# Patient Record
Sex: MALE | Race: WHITE | NOT HISPANIC OR LATINO | URBAN - METROPOLITAN AREA
[De-identification: names, ages, dates, MRNs, and addresses within clinical notes are randomized per-mention and may not be internally consistent; named-entity substitution may affect disease eponyms.]

---

## 2019-01-03 ENCOUNTER — INPATIENT (INPATIENT)
Facility: HOSPITAL | Age: 49
LOS: 3 days | Discharge: HOME | DRG: 195 | End: 2019-01-07
Attending: INTERNAL MEDICINE | Admitting: INTERNAL MEDICINE
Payer: COMMERCIAL

## 2019-01-03 VITALS
RESPIRATION RATE: 18 BRPM | SYSTOLIC BLOOD PRESSURE: 137 MMHG | HEART RATE: 129 BPM | TEMPERATURE: 98 F | OXYGEN SATURATION: 96 % | DIASTOLIC BLOOD PRESSURE: 80 MMHG

## 2019-01-03 DIAGNOSIS — Z95.2 PRESENCE OF PROSTHETIC HEART VALVE: Chronic | ICD-10-CM

## 2019-01-03 LAB
ALBUMIN SERPL ELPH-MCNC: 3.6 G/DL — SIGNIFICANT CHANGE UP (ref 3.4–5)
ALP SERPL-CCNC: 75 U/L — SIGNIFICANT CHANGE UP (ref 40–120)
ALT FLD-CCNC: 54 U/L — HIGH (ref 12–42)
ANION GAP SERPL CALC-SCNC: 8 MMOL/L — LOW (ref 9–16)
APPEARANCE UR: CLEAR — SIGNIFICANT CHANGE UP
APTT BLD: 29.3 SEC — SIGNIFICANT CHANGE UP (ref 27.5–36.3)
AST SERPL-CCNC: 39 U/L — HIGH (ref 15–37)
BASOPHILS NFR BLD AUTO: 0.4 % — SIGNIFICANT CHANGE UP (ref 0–2)
BILIRUB SERPL-MCNC: 0.8 MG/DL — SIGNIFICANT CHANGE UP (ref 0.2–1.2)
BILIRUB UR-MCNC: NEGATIVE — SIGNIFICANT CHANGE UP
BUN SERPL-MCNC: 17 MG/DL — SIGNIFICANT CHANGE UP (ref 7–23)
CALCIUM SERPL-MCNC: 9 MG/DL — SIGNIFICANT CHANGE UP (ref 8.5–10.5)
CHLORIDE SERPL-SCNC: 102 MMOL/L — SIGNIFICANT CHANGE UP (ref 96–108)
CO2 SERPL-SCNC: 24 MMOL/L — SIGNIFICANT CHANGE UP (ref 22–31)
COLOR SPEC: YELLOW — SIGNIFICANT CHANGE UP
CREAT SERPL-MCNC: 1 MG/DL — SIGNIFICANT CHANGE UP (ref 0.5–1.3)
DIFF PNL FLD: NEGATIVE — SIGNIFICANT CHANGE UP
EOSINOPHIL NFR BLD AUTO: 0.3 % — SIGNIFICANT CHANGE UP (ref 0–6)
GLUCOSE SERPL-MCNC: 206 MG/DL — HIGH (ref 70–99)
GLUCOSE UR QL: NEGATIVE — SIGNIFICANT CHANGE UP
HCT VFR BLD CALC: 47.9 % — SIGNIFICANT CHANGE UP (ref 39–50)
HGB BLD-MCNC: 16.5 G/DL — SIGNIFICANT CHANGE UP (ref 13–17)
IMM GRANULOCYTES NFR BLD AUTO: 0.4 % — SIGNIFICANT CHANGE UP (ref 0–1.5)
INR BLD: 1.13 — SIGNIFICANT CHANGE UP (ref 0.88–1.16)
KETONES UR-MCNC: NEGATIVE — SIGNIFICANT CHANGE UP
LACTATE SERPL-SCNC: 2 MMOL/L — SIGNIFICANT CHANGE UP (ref 0.4–2)
LEUKOCYTE ESTERASE UR-ACNC: NEGATIVE — SIGNIFICANT CHANGE UP
LYMPHOCYTES # BLD AUTO: 18.3 % — SIGNIFICANT CHANGE UP (ref 13–44)
MCHC RBC-ENTMCNC: 30.6 PG — SIGNIFICANT CHANGE UP (ref 27–34)
MCHC RBC-ENTMCNC: 34.4 G/DL — SIGNIFICANT CHANGE UP (ref 32–36)
MCV RBC AUTO: 88.9 FL — SIGNIFICANT CHANGE UP (ref 80–100)
MONOCYTES NFR BLD AUTO: 15 % — HIGH (ref 2–14)
NEUTROPHILS NFR BLD AUTO: 65.6 % — SIGNIFICANT CHANGE UP (ref 43–77)
NITRITE UR-MCNC: NEGATIVE — SIGNIFICANT CHANGE UP
PH UR: 6 — SIGNIFICANT CHANGE UP (ref 5–8)
PLATELET # BLD AUTO: 204 K/UL — SIGNIFICANT CHANGE UP (ref 150–400)
POTASSIUM SERPL-MCNC: 4.5 MMOL/L — SIGNIFICANT CHANGE UP (ref 3.5–5.3)
POTASSIUM SERPL-SCNC: 4.5 MMOL/L — SIGNIFICANT CHANGE UP (ref 3.5–5.3)
PROT SERPL-MCNC: 7.5 G/DL — SIGNIFICANT CHANGE UP (ref 6.4–8.2)
PROT UR-MCNC: NEGATIVE MG/DL — SIGNIFICANT CHANGE UP
PROTHROM AB SERPL-ACNC: 12.5 SEC — SIGNIFICANT CHANGE UP (ref 10–12.9)
RBC # BLD: 5.39 M/UL — SIGNIFICANT CHANGE UP (ref 4.2–5.8)
RBC # FLD: 12.3 % — SIGNIFICANT CHANGE UP (ref 10.3–14.5)
SODIUM SERPL-SCNC: 134 MMOL/L — SIGNIFICANT CHANGE UP (ref 132–145)
SP GR SPEC: >=1.03 — SIGNIFICANT CHANGE UP (ref 1–1.03)
UROBILINOGEN FLD QL: 0.2 E.U./DL — SIGNIFICANT CHANGE UP
WBC # BLD: 6.7 K/UL — SIGNIFICANT CHANGE UP (ref 3.8–10.5)
WBC # FLD AUTO: 6.7 K/UL — SIGNIFICANT CHANGE UP (ref 3.8–10.5)

## 2019-01-03 PROCEDURE — 71046 X-RAY EXAM CHEST 2 VIEWS: CPT | Mod: 26

## 2019-01-03 PROCEDURE — 99284 EMERGENCY DEPT VISIT MOD MDM: CPT | Mod: 25

## 2019-01-03 PROCEDURE — 93010 ELECTROCARDIOGRAM REPORT: CPT

## 2019-01-03 RX ORDER — SODIUM CHLORIDE 9 MG/ML
2400 INJECTION INTRAMUSCULAR; INTRAVENOUS; SUBCUTANEOUS ONCE
Qty: 0 | Refills: 0 | Status: COMPLETED | OUTPATIENT
Start: 2019-01-03 | End: 2019-01-03

## 2019-01-03 RX ORDER — ACETAMINOPHEN 500 MG
975 TABLET ORAL ONCE
Qty: 0 | Refills: 0 | Status: COMPLETED | OUTPATIENT
Start: 2019-01-03 | End: 2019-01-03

## 2019-01-03 RX ORDER — IBUPROFEN 200 MG
800 TABLET ORAL ONCE
Qty: 0 | Refills: 0 | Status: COMPLETED | OUTPATIENT
Start: 2019-01-03 | End: 2019-01-03

## 2019-01-03 RX ORDER — VANCOMYCIN HCL 1 G
1000 VIAL (EA) INTRAVENOUS ONCE
Qty: 0 | Refills: 0 | Status: COMPLETED | OUTPATIENT
Start: 2019-01-03 | End: 2019-01-03

## 2019-01-03 RX ORDER — VANCOMYCIN HCL 1 G
500 VIAL (EA) INTRAVENOUS ONCE
Qty: 0 | Refills: 0 | Status: COMPLETED | OUTPATIENT
Start: 2019-01-03 | End: 2019-01-03

## 2019-01-03 RX ADMIN — SODIUM CHLORIDE 1200 MILLILITER(S): 9 INJECTION INTRAMUSCULAR; INTRAVENOUS; SUBCUTANEOUS at 19:44

## 2019-01-03 RX ADMIN — Medication 800 MILLIGRAM(S): at 19:44

## 2019-01-03 RX ADMIN — Medication 975 MILLIGRAM(S): at 19:44

## 2019-01-03 RX ADMIN — Medication 1000 MILLIGRAM(S): at 19:24

## 2019-01-03 RX ADMIN — Medication 100 MILLIGRAM(S): at 22:46

## 2019-01-03 RX ADMIN — SODIUM CHLORIDE 2400 MILLILITER(S): 9 INJECTION INTRAMUSCULAR; INTRAVENOUS; SUBCUTANEOUS at 19:47

## 2019-01-03 RX ADMIN — Medication 250 MILLIGRAM(S): at 18:15

## 2019-01-03 NOTE — ED PROVIDER NOTE - OBJECTIVE STATEMENT
48 y.o. male with hx prosthetic valve (unsure which valve) placed 6 years ago (congenital problem), with c/o subjective fever/chills for 2 days, non productive cough, gen weakness.  Has been taking levaquin PO x 1 dose and tylenol at home. on arrival to ED mild tachy 120s, afebrile in NAD.

## 2019-01-03 NOTE — ED PROVIDER NOTE - PROGRESS NOTE DETAILS
[facility] cardiology dr [facility]/endocarditis workup. accepted for admission to [facility]/cardiology DR DON

## 2019-01-03 NOTE — ED ADULT NURSE NOTE - CHIEF COMPLAINT QUOTE
Patient c/o fever and cough. Visiting from [place]. Hx. Valve replacement. Taking Paracetamol 1000mg q. 4 hrs. Afebrile on arrival. , denies palpitations or chest pain.

## 2019-01-03 NOTE — ED ADULT TRIAGE NOTE - CHIEF COMPLAINT QUOTE
Patient c/o fever and cough. Visiting from [place]. Hx. Valve replacement. Taking Paracetamol 1000mg q. 4 hrs. Afebrile on arrival. , denies palpitations or chest pain.

## 2019-01-03 NOTE — ED PROVIDER NOTE - MEDICAL DECISION MAKING DETAILS
[facility] for [facility]/endocarditis workup. accepted for admission to [facility]/cardiology DR DON

## 2019-01-03 NOTE — PATIENT PROFILE ADULT - OVER THE PAST TWO WEEKS, HAVE YOU FELT LITTLE INTEREST OR PLEASURE IN DOING THINGS?
Partial Purse String (Intermediate) Text: Given the location of the defect and the characteristics of the surrounding skin an intermediate purse string closure was deemed most appropriate.  Undermining was performed circumferentially around the surgical defect.  A purse string suture was then placed and tightened. Wound tension of the circular defect prevented complete closure of the wound. no

## 2019-01-04 DIAGNOSIS — Z90.49 ACQUIRED ABSENCE OF OTHER SPECIFIED PARTS OF DIGESTIVE TRACT: Chronic | ICD-10-CM

## 2019-01-04 DIAGNOSIS — Q23.9 CONGENITAL MALFORMATION OF AORTIC AND MITRAL VALVES, UNSPECIFIED: ICD-10-CM

## 2019-01-04 DIAGNOSIS — R50.9 FEVER, UNSPECIFIED: ICD-10-CM

## 2019-01-04 LAB
ALBUMIN SERPL ELPH-MCNC: 4.1 G/DL — SIGNIFICANT CHANGE UP (ref 3.3–5)
ALP SERPL-CCNC: 64 U/L — SIGNIFICANT CHANGE UP (ref 40–120)
ALT FLD-CCNC: 49 U/L — HIGH (ref 10–45)
APTT BLD: 30 SEC — SIGNIFICANT CHANGE UP (ref 27.5–36.3)
AST SERPL-CCNC: 29 U/L — SIGNIFICANT CHANGE UP (ref 10–40)
BILIRUB DIRECT SERPL-MCNC: <0.2 MG/DL — SIGNIFICANT CHANGE UP (ref 0–0.2)
BILIRUB INDIRECT FLD-MCNC: >0.4 MG/DL — SIGNIFICANT CHANGE UP (ref 0.2–1)
BILIRUB SERPL-MCNC: 0.6 MG/DL — SIGNIFICANT CHANGE UP (ref 0.2–1.2)
CHOLEST SERPL-MCNC: 272 MG/DL — HIGH (ref 10–199)
CK MB CFR SERPL CALC: <1 NG/ML — SIGNIFICANT CHANGE UP (ref 0–6.7)
CK SERPL-CCNC: 157 U/L — SIGNIFICANT CHANGE UP (ref 30–200)
CULTURE RESULTS: NO GROWTH — SIGNIFICANT CHANGE UP
D DIMER BLD IA.RAPID-MCNC: 206 NG/ML DDU — SIGNIFICANT CHANGE UP
FLUAV H1 RNA SPEC QL NAA+PROBE: DETECTED
HCT VFR BLD CALC: 48.1 % — SIGNIFICANT CHANGE UP (ref 39–50)
HDLC SERPL-MCNC: 65 MG/DL — SIGNIFICANT CHANGE UP
HGB BLD-MCNC: 16.5 G/DL — SIGNIFICANT CHANGE UP (ref 13–17)
INR BLD: 1.11 — SIGNIFICANT CHANGE UP (ref 0.88–1.16)
LIPID PNL WITH DIRECT LDL SERPL: 183 MG/DL — HIGH
MCHC RBC-ENTMCNC: 30.8 PG — SIGNIFICANT CHANGE UP (ref 27–34)
MCHC RBC-ENTMCNC: 34.3 G/DL — SIGNIFICANT CHANGE UP (ref 32–36)
MCV RBC AUTO: 89.7 FL — SIGNIFICANT CHANGE UP (ref 80–100)
PLATELET # BLD AUTO: 171 K/UL — SIGNIFICANT CHANGE UP (ref 150–400)
PROT SERPL-MCNC: 6.6 G/DL — SIGNIFICANT CHANGE UP (ref 6–8.3)
PROTHROM AB SERPL-ACNC: 12.6 SEC — SIGNIFICANT CHANGE UP (ref 10–12.9)
RAPID RVP RESULT: DETECTED
RBC # BLD: 5.36 M/UL — SIGNIFICANT CHANGE UP (ref 4.2–5.8)
RBC # FLD: 12.5 % — SIGNIFICANT CHANGE UP (ref 10.3–16.9)
SPECIMEN SOURCE: SIGNIFICANT CHANGE UP
TOTAL CHOLESTEROL/HDL RATIO MEASUREMENT: 4.2 RATIO — SIGNIFICANT CHANGE UP (ref 3.4–9.6)
TRIGL SERPL-MCNC: 122 MG/DL — SIGNIFICANT CHANGE UP (ref 10–149)
TROPONIN T SERPL-MCNC: <0.01 NG/ML — SIGNIFICANT CHANGE UP (ref 0–0.01)
WBC # BLD: 6.9 K/UL — SIGNIFICANT CHANGE UP (ref 3.8–10.5)
WBC # FLD AUTO: 6.9 K/UL — SIGNIFICANT CHANGE UP (ref 3.8–10.5)

## 2019-01-04 PROCEDURE — 93306 TTE W/DOPPLER COMPLETE: CPT | Mod: 26

## 2019-01-04 PROCEDURE — 99233 SBSQ HOSP IP/OBS HIGH 50: CPT

## 2019-01-04 RX ORDER — ACETAMINOPHEN 500 MG
650 TABLET ORAL EVERY 6 HOURS
Qty: 0 | Refills: 0 | Status: DISCONTINUED | OUTPATIENT
Start: 2019-01-04 | End: 2019-01-07

## 2019-01-04 RX ORDER — VANCOMYCIN HCL 1 G
1500 VIAL (EA) INTRAVENOUS EVERY 12 HOURS
Qty: 0 | Refills: 0 | Status: DISCONTINUED | OUTPATIENT
Start: 2019-01-04 | End: 2019-01-04

## 2019-01-04 RX ORDER — BENZOCAINE AND MENTHOL 5; 1 G/100ML; G/100ML
1 LIQUID ORAL THREE TIMES A DAY
Qty: 0 | Refills: 0 | Status: DISCONTINUED | OUTPATIENT
Start: 2019-01-04 | End: 2019-01-07

## 2019-01-04 RX ORDER — ACETAMINOPHEN 500 MG
650 TABLET ORAL ONCE
Qty: 0 | Refills: 0 | Status: COMPLETED | OUTPATIENT
Start: 2019-01-04 | End: 2019-01-04

## 2019-01-04 RX ORDER — INFLUENZA VIRUS VACCINE 15; 15; 15; 15 UG/.5ML; UG/.5ML; UG/.5ML; UG/.5ML
0.5 SUSPENSION INTRAMUSCULAR ONCE
Qty: 0 | Refills: 0 | Status: DISCONTINUED | OUTPATIENT
Start: 2019-01-04 | End: 2019-01-04

## 2019-01-04 RX ORDER — SODIUM CHLORIDE 9 MG/ML
1000 INJECTION INTRAMUSCULAR; INTRAVENOUS; SUBCUTANEOUS
Qty: 0 | Refills: 0 | Status: DISCONTINUED | OUTPATIENT
Start: 2019-01-04 | End: 2019-01-07

## 2019-01-04 RX ORDER — SODIUM CHLORIDE 9 MG/ML
500 INJECTION INTRAMUSCULAR; INTRAVENOUS; SUBCUTANEOUS
Qty: 0 | Refills: 0 | Status: DISCONTINUED | OUTPATIENT
Start: 2019-01-04 | End: 2019-01-07

## 2019-01-04 RX ORDER — HEPARIN SODIUM 5000 [USP'U]/ML
5000 INJECTION INTRAVENOUS; SUBCUTANEOUS EVERY 8 HOURS
Qty: 0 | Refills: 0 | Status: DISCONTINUED | OUTPATIENT
Start: 2019-01-04 | End: 2019-01-07

## 2019-01-04 RX ADMIN — Medication 650 MILLIGRAM(S): at 08:41

## 2019-01-04 RX ADMIN — SODIUM CHLORIDE 75 MILLILITER(S): 9 INJECTION INTRAMUSCULAR; INTRAVENOUS; SUBCUTANEOUS at 23:00

## 2019-01-04 RX ADMIN — Medication 650 MILLIGRAM(S): at 04:08

## 2019-01-04 RX ADMIN — Medication 100 MILLIGRAM(S): at 22:36

## 2019-01-04 RX ADMIN — BENZOCAINE AND MENTHOL 1 LOZENGE: 5; 1 LIQUID ORAL at 15:32

## 2019-01-04 RX ADMIN — Medication 650 MILLIGRAM(S): at 22:36

## 2019-01-04 RX ADMIN — BENZOCAINE AND MENTHOL 1 LOZENGE: 5; 1 LIQUID ORAL at 22:39

## 2019-01-04 RX ADMIN — Medication 75 MILLIGRAM(S): at 07:44

## 2019-01-04 RX ADMIN — Medication 650 MILLIGRAM(S): at 14:42

## 2019-01-04 RX ADMIN — Medication 75 MILLIGRAM(S): at 22:37

## 2019-01-04 RX ADMIN — HEPARIN SODIUM 5000 UNIT(S): 5000 INJECTION INTRAVENOUS; SUBCUTANEOUS at 22:38

## 2019-01-04 RX ADMIN — Medication 650 MILLIGRAM(S): at 04:45

## 2019-01-04 RX ADMIN — Medication 100 MILLIGRAM(S): at 08:45

## 2019-01-04 RX ADMIN — SODIUM CHLORIDE 100 MILLILITER(S): 9 INJECTION INTRAMUSCULAR; INTRAVENOUS; SUBCUTANEOUS at 15:49

## 2019-01-04 RX ADMIN — Medication 650 MILLIGRAM(S): at 23:00

## 2019-01-04 RX ADMIN — Medication 650 MILLIGRAM(S): at 09:19

## 2019-01-04 RX ADMIN — Medication 650 MILLIGRAM(S): at 15:56

## 2019-01-04 RX ADMIN — Medication 100 MILLIGRAM(S): at 14:46

## 2019-01-04 NOTE — H&P ADULT - NSHPREVIEWOFSYSTEMS_GEN_ALL_CORE
GENERAL, CONSTITUTIONAL : admits to fever (subjective Tmax 102F) and chills  EYES, VISION: denies changes in vision   EARS, NOSE, THROAT: denies hearing loss  HEART, CARDIOVASCULAR: denies chest pain, arrhythmia  palpitations,   RESPIRATORY: admits to productive cough, denies SOB, wheezing, PND, orthopnea  GASTROINTESTINAL: Denies abdominal pain, heartburn, bloody stool, dark tarry stool  GENITOURINARY: Denies frequent urination, urgency  MUSCULOSKELETAL denies joint pain or swelling, restricted motion, musculoskeletal pain.   SKIN & INTEGUMENTARY Denies rashes, sores, blisters, blisters, growths.  NEUROLOGICAL: Denies numbness or tingling sensations, sensation loss, burning.   PSYCHIATRIC: Denies nervousness, anxiety, depression  ENDOCRINE Denies heat or cold intolerance, excessive thirst  HEMATOLOGIC/LYMPHATIC: Denies abnormal bleeding, bleeding of any kind

## 2019-01-04 NOTE — H&P ADULT - PMH
Congenital aortic valve anomaly  s/p AVR (Porcine valve 6 yrs ago) in [place]  Valvular disease  cardiac valve

## 2019-01-04 NOTE — H&P ADULT - HISTORY OF PRESENT ILLNESS
Hong Konger  # [id number]    49 y/o Hong Konger speaking male visiting from [place] (supposed to fly back on Sunday, January 6, 2019) with PMHx of Congenital Aortic Valve defect (s/p AVR w/ porcine valve 6yrs ago in [place]) presents as a transfer from [facility] this evening, 1/3/19, to [facility] w/ subjective fever (Tmax 102.F yesterday evening, 1/2/19), chills and productive cough X2 days, pt. noted to be Tachycardic 's, r/o Endocarditis.   According to patient, he was in his usual good state of health until Wednesday evening, he felt congestive, with productive cough, fever (pt. took oral temperature 102F) and chills.  Pt. took Levofloxacin 500mg PO X1 Wednesday evening, pt. states he carries antibiotic with him ever since he had AVR as prophylaxis measure in case he has to go to dentist or has URI.    Pt. states on Thursday morning he still felt feverish, took another Levofloxacin 500mg PO X1 hoping symptoms would resolve right away. Later in the afternoon pt. became anxious since symptoms have not resolved and went to [facility] for further evaluation.   Pt. denies, cp, SOB, palpitations, dizziness, abdominal discomfort, N/V and bowel changes. In ER ECG reveals NSR@78 bpm with non specific ST-T wave changes, Chadian  # [id number]    49 y/o Chadian speaking male visiting from [place] (supposed to fly back on Sunday, January 6, 2019) with PMHx of Congenital Aortic Valve defect (s/p AVR w/ porcine valve 6yrs ago in [place]) presents as a transfer from [facility] this evening, 1/3/19, to [address] w/ subjective fever (Tmax 102.F yesterday evening, 1/2/19), chills and productive cough X2 days, pt. noted to be Tachycardic 's, r/o Endocarditis.   According to patient, he was in his usual good state of health until Wednesday evening, he felt congestive, with productive cough, fever (pt. took oral temperature 102F) and chills.  Pt. took Levofloxacin 500mg PO X1 Wednesday evening, pt. states he carries antibiotic with him ever since he had AVR as prophylaxis measure in case he has to go to dentist or has URI.    Pt. states on Thursday morning he still felt feverish, took another Levofloxacin 500mg PO X1 hoping symptoms would resolve right away. Later in the afternoon pt. became anxious since symptoms have not resolved and went to [facility] for further evaluation.   Pt. denies, cp, SOB, palpitations, dizziness, abdominal discomfort, N/V and bowel changes. In ER ECG reveals NSR@78 bpm with non specific ST-T wave changes, wbc normal (6.7), Monocyte 15.0, Blood Glucose 206, Temp 99.0F  CXR reveals no acute pathology seen, UA negative.  Blood cultures drawn f/u results (for r/o Endocarditis), pt. was given Vancomycin 1500mg IV X1.  Pt. was explained he will need OPAL; pt. is refusing.   Pt. transferred to [address] for telemetry, ECG, Echocardiogram, RVP panel and NPO for further workup to r/o Endocarditis.  Pt. remains afebrile and states he is feeling better.  F/U RVP panel.  Discuss further with Dr. Anne in AM.

## 2019-01-04 NOTE — H&P ADULT - NSHPLABSRESULTS_GEN_ALL_CORE
16.5   6.7   )-----------( 204      ( 03 Jan 2019 18:01 )             47.9       01-03    134  |  102  |  17  ----------------------------<  206<H>  4.5   |  24  |  1.00    Ca    9.0      03 Jan 2019 18:01    TPro  7.5  /  Alb  3.6  /  TBili  0.8  /  DBili  x   /  AST  39<H>  /  ALT  54<H>  /  AlkPhos  75  01-03      PT/INR - ( 03 Jan 2019 18:01 )   PT: 12.5 sec;   INR: 1.13          PTT - ( 03 Jan 2019 18:01 )  PTT:29.3 sec          Urinalysis Basic - ( 03 Jan 2019 19:45 )    Color: Yellow / Appearance: Clear / SG: >=1.030 / pH: x  Gluc: x / Ketone: NEGATIVE  / Bili: NEGATIVE / Urobili: 0.2 E.U./dL   Blood: x / Protein: NEGATIVE mg/dL / Nitrite: NEGATIVE   Leuk Esterase: NEGATIVE / RBC: x / WBC x   Sq Epi: x / Non Sq Epi: x / Bacteria: x        EKG: NSR@78bpm with non specific ST-T wave changes

## 2019-01-04 NOTE — H&P ADULT - ATTENDING COMMENTS
Reviewed NP/PA/Resident documentation. Reviewed vitals, labs, medications, cardiac studies and additional imaging 1/4/19. Agree with the above with the following additions/amendments:  48M visiting from [place] (adamant about returning on 1/6/19) with h/o congenital AV defect s/p bio AVR, p/w acute influenza infection, febrile, tachycardic.  TTE unremarkable with good function of prosthetic valve. Febrile earlier today, no longer on antibiotics. Awaiting blood cultures to return.  If recurrence of fever on 1/5/19, plan to resume abx, c/s ID and obtain OPAL.  Patient not amenable to this plan and is adamant about leaving on 1/6/19.

## 2019-01-04 NOTE — H&P ADULT - REASON FOR ADMISSION
Productive cough w/ fever and chills (subjective Tmax 102F yesterday evening) X2 days.  Pt. is visiting from [place] until Sunday, January 6, 2019

## 2019-01-04 NOTE — H&P ADULT - NSHPPHYSICALEXAM_GEN_ALL_CORE
Temp 98.6F, HR 70bpm, /80, RR 16, O2 sat 98% RA, Weight 200lbs, Height 5' 6"    T(C): 37.7 (01-03-19 @ 21:50), Max: 37.7 (01-03-19 @ 18:29)  HR: 82 (01-03-19 @ 23:44) (82 - 129)  BP: 145/79 (01-03-19 @ 23:44) (127/63 - 145/79)  RR: 18 (01-03-19 @ 23:44) (17 - 18)  SpO2: 97% (01-03-19 @ 23:44) (96% - 99%)  Wt(kg): --    Appearance: Normal	  HEENT:   Normal oral mucosa, PERRL, EOMI	  Neck: Supple, + JVD/ - JVD; No Carotid Bruit and 2+ pulses B/L  Cardiovascular: Normal S1 S2, No JVD, No murmurs  Respiratory: Lungs clear to auscultation/Decreased Breath Sounds/No Rales, Rhonchi, Wheezing	  Gastrointestinal:  Soft, Non-tender, + BS	  Skin: No rashes, No ecchymoses, No cyanosis  Extremities: Normal range of motion, No clubbing, cyanosis or edema  Vascular: Femoral pulses 2+ b/l without bruit, DP 1+ b/l, PT 1+ b/l  Neurologic: Non-focal  Psychiatry: A & O x 3, Mood & affect appropriate T(C): 37.7 (01-03-19 @ 21:50), Max: 37.7 (01-03-19 @ 18:29)  HR: 82 (01-03-19 @ 23:44) (82 - 129)  BP: 145/79 (01-03-19 @ 23:44) (127/63 - 145/79)  RR: 18 (01-03-19 @ 23:44) (17 - 18)  SpO2: 97% (01-03-19 @ 23:44) (96% - 99%)  Wt(kg): --    Appearance: Normal	  HEENT:   Normal oral mucosa, PERRL, EOMI	  Neck: Supple, - JVD; No Carotid Bruit and 2+ pulses B/L  Cardiovascular: Normal S1 S2, No JVD, No murmurs  Respiratory: Lungs clear to auscultation//No Rales, Rhonchi, Wheezing	  Gastrointestinal:  Soft, Non-tender, + BS	  Skin: No rashes, No ecchymoses, No cyanosis  Extremities: Normal range of motion, No clubbing, cyanosis or edema  Vascular: Femoral pulses 2+ b/l without bruit, DP 2+ b/l, PT 1+ b/l  Neurologic: Non-focal  Psychiatry: A & O x 3, Mood & affect appropriate T(C): 37.7 (01-03-19 @ 21:50), Max: 37.7 (01-03-19 @ 18:29)  HR: 82 (01-03-19 @ 23:44) (82 - 129)  BP: 145/79 (01-03-19 @ 23:44) (127/63 - 145/79)  RR: 18 (01-03-19 @ 23:44) (17 - 18)  SpO2: 97% (01-03-19 @ 23:44) (96% - 99%)  Wt(kg): --    Appearance: Normal	  HEENT:   Normal oral mucosa, PERRL, EOMI	  Neck: Supple, - JVD; No Carotid Bruit and 2+ pulses B/L  Cardiovascular: Normal S1 S2, No JVD, +systolic murmur heard 2nd RICS radiating across chest  Respiratory: Lungs clear to auscultation//No Rales, Rhonchi, Wheezing	  Gastrointestinal:  Soft, Non-tender, + BS	  Skin: No rashes, No ecchymoses, No cyanosis  Extremities: Normal range of motion, No clubbing, cyanosis or edema  Vascular: Femoral pulses 2+ b/l without bruit, DP 2+ b/l, PT 1+ b/l  Neurologic: Non-focal  Psychiatry: A & O x 3, Mood & affect appropriate

## 2019-01-04 NOTE — H&P ADULT - PROBLEM SELECTOR PLAN 1
Pt. w/ hx of AVR (Porcine valve) 6yrs ago in [place].  Pt. endorses fever, chills and productive cough two days.  Pt. took his own Levofloxacin 500mg PO X2 for symptoms 2 days.    F/U blood cultures, labs, Echocardiogram, (pt. refusing OPAL) and RVP panel.  Pt. is to be ruled out for Endocarditis

## 2019-01-04 NOTE — H&P ADULT - PROBLEM SELECTOR PLAN 2
Echocardiogram in AM.  AVR (Porcine valve) 6 yrs ago in [place].  Pt. states he carries Levofloxacin as prophylaxis measure, in case he goes to dentist or has URI.

## 2019-01-04 NOTE — H&P ADULT - ASSESSMENT
47 y/o Kinyarwanda speaking male visiting from [place] (supposed to fly back on Sunday, January 6, 2019) with PMHx of Congenital Aortic Valve defect (s/p AVR w/ porcine valve 6yrs ago in [place]) presents as a transfer from [facility] this evening, 1/3/19, to [address] w/ subjective fever (Tmax 102.F yesterday evening, 1/2/19), chills and productive cough X2 days, pt. noted to be Tachycardic 's, r/o Endocarditis.   Blood cultures drawn f/u results (for r/o Endocarditis), pt. was given Vancomycin 1500mg IV X1.  Pt. was explained he will need OPAL; pt. is refusing.   Pt. transferred to [address] for telemetry, ECG, Echocardiogram, RVP panel and NPO for further workup to r/o Endocarditis.  Pt. remains afebrile and states he is feeling better. Continue Vancomycin 1500mg IV bid in AM.  F/U RVP panel.  Discuss further with Dr. Anne in AM.

## 2019-01-05 LAB
ANION GAP SERPL CALC-SCNC: 15 MMOL/L — SIGNIFICANT CHANGE UP (ref 5–17)
BASOPHILS NFR BLD AUTO: 0.2 % — SIGNIFICANT CHANGE UP (ref 0–2)
BUN SERPL-MCNC: 9 MG/DL — SIGNIFICANT CHANGE UP (ref 7–23)
CALCIUM SERPL-MCNC: 9 MG/DL — SIGNIFICANT CHANGE UP (ref 8.4–10.5)
CHLORIDE SERPL-SCNC: 98 MMOL/L — SIGNIFICANT CHANGE UP (ref 96–108)
CO2 SERPL-SCNC: 24 MMOL/L — SIGNIFICANT CHANGE UP (ref 22–31)
CREAT SERPL-MCNC: 0.82 MG/DL — SIGNIFICANT CHANGE UP (ref 0.5–1.3)
CRP SERPL-MCNC: 6.5 MG/DL — HIGH (ref 0–0.4)
EOSINOPHIL NFR BLD AUTO: 0.2 % — SIGNIFICANT CHANGE UP (ref 0–6)
ERYTHROCYTE [SEDIMENTATION RATE] IN BLOOD: 12 MM/HR — SIGNIFICANT CHANGE UP
GLUCOSE SERPL-MCNC: 201 MG/DL — HIGH (ref 70–99)
HCT VFR BLD CALC: 48.2 % — SIGNIFICANT CHANGE UP (ref 39–50)
HGB BLD-MCNC: 16.5 G/DL — SIGNIFICANT CHANGE UP (ref 13–17)
LDH SERPL L TO P-CCNC: 281 U/L — HIGH (ref 50–242)
LYMPHOCYTES # BLD AUTO: 38.8 % — SIGNIFICANT CHANGE UP (ref 13–44)
MAGNESIUM SERPL-MCNC: 1.8 MG/DL — SIGNIFICANT CHANGE UP (ref 1.6–2.6)
MCHC RBC-ENTMCNC: 30.7 PG — SIGNIFICANT CHANGE UP (ref 27–34)
MCHC RBC-ENTMCNC: 34.2 G/DL — SIGNIFICANT CHANGE UP (ref 32–36)
MCV RBC AUTO: 89.6 FL — SIGNIFICANT CHANGE UP (ref 80–100)
MONOCYTES NFR BLD AUTO: 14.9 % — HIGH (ref 2–14)
NEUTROPHILS NFR BLD AUTO: 45.9 % — SIGNIFICANT CHANGE UP (ref 43–77)
PLATELET # BLD AUTO: 173 K/UL — SIGNIFICANT CHANGE UP (ref 150–400)
POTASSIUM SERPL-MCNC: 3.9 MMOL/L — SIGNIFICANT CHANGE UP (ref 3.5–5.3)
POTASSIUM SERPL-SCNC: 3.9 MMOL/L — SIGNIFICANT CHANGE UP (ref 3.5–5.3)
RBC # BLD: 5.38 M/UL — SIGNIFICANT CHANGE UP (ref 4.2–5.8)
RBC # FLD: 12.6 % — SIGNIFICANT CHANGE UP (ref 10.3–16.9)
SODIUM SERPL-SCNC: 137 MMOL/L — SIGNIFICANT CHANGE UP (ref 135–145)
VANCOMYCIN TROUGH SERPL-MCNC: <4 UG/ML — LOW (ref 10–20)
WBC # BLD: 6.5 K/UL — SIGNIFICANT CHANGE UP (ref 3.8–10.5)
WBC # FLD AUTO: 6.5 K/UL — SIGNIFICANT CHANGE UP (ref 3.8–10.5)

## 2019-01-05 PROCEDURE — 99232 SBSQ HOSP IP/OBS MODERATE 35: CPT

## 2019-01-05 PROCEDURE — 99254 IP/OBS CNSLTJ NEW/EST MOD 60: CPT

## 2019-01-05 RX ORDER — POTASSIUM CHLORIDE 20 MEQ
20 PACKET (EA) ORAL ONCE
Qty: 0 | Refills: 0 | Status: COMPLETED | OUTPATIENT
Start: 2019-01-05 | End: 2019-01-05

## 2019-01-05 RX ORDER — ACYCLOVIR 50 MG/G
1 OINTMENT TOPICAL
Qty: 0 | Refills: 0 | Status: DISCONTINUED | OUTPATIENT
Start: 2019-01-05 | End: 2019-01-07

## 2019-01-05 RX ORDER — MAGNESIUM OXIDE 400 MG ORAL TABLET 241.3 MG
800 TABLET ORAL ONCE
Qty: 0 | Refills: 0 | Status: COMPLETED | OUTPATIENT
Start: 2019-01-05 | End: 2019-01-05

## 2019-01-05 RX ADMIN — BENZOCAINE AND MENTHOL 1 LOZENGE: 5; 1 LIQUID ORAL at 21:28

## 2019-01-05 RX ADMIN — ACYCLOVIR 1 APPLICATION(S): 50 OINTMENT TOPICAL at 11:57

## 2019-01-05 RX ADMIN — Medication 75 MILLIGRAM(S): at 21:15

## 2019-01-05 RX ADMIN — BENZOCAINE AND MENTHOL 1 LOZENGE: 5; 1 LIQUID ORAL at 15:35

## 2019-01-05 RX ADMIN — Medication 100 MILLIGRAM(S): at 05:32

## 2019-01-05 RX ADMIN — HEPARIN SODIUM 5000 UNIT(S): 5000 INJECTION INTRAVENOUS; SUBCUTANEOUS at 14:11

## 2019-01-05 RX ADMIN — HEPARIN SODIUM 5000 UNIT(S): 5000 INJECTION INTRAVENOUS; SUBCUTANEOUS at 05:32

## 2019-01-05 RX ADMIN — Medication 100 MILLIGRAM(S): at 14:16

## 2019-01-05 RX ADMIN — Medication 100 MILLIGRAM(S): at 21:21

## 2019-01-05 RX ADMIN — ACYCLOVIR 1 APPLICATION(S): 50 OINTMENT TOPICAL at 21:20

## 2019-01-05 RX ADMIN — Medication 75 MILLIGRAM(S): at 07:59

## 2019-01-05 RX ADMIN — Medication 20 MILLIEQUIVALENT(S): at 07:59

## 2019-01-05 RX ADMIN — MAGNESIUM OXIDE 400 MG ORAL TABLET 800 MILLIGRAM(S): 241.3 TABLET ORAL at 07:58

## 2019-01-05 RX ADMIN — HEPARIN SODIUM 5000 UNIT(S): 5000 INJECTION INTRAVENOUS; SUBCUTANEOUS at 21:16

## 2019-01-05 RX ADMIN — BENZOCAINE AND MENTHOL 1 LOZENGE: 5; 1 LIQUID ORAL at 05:33

## 2019-01-05 NOTE — PROGRESS NOTE ADULT - PROBLEM SELECTOR PLAN 2
Echocardiogram in AM.  AVR (Porcine valve) 6 yrs ago in [place].  Pt. states he carries Levofloxacin as prophylaxis measure, in case he goes to dentist or has URI. AVR (Porcine valve) in 2013 in [place].    - Pt states he carries Levofloxacin as prophylaxis measure, in case he goes to dentist or has URI.  - TTE revealed bioprosthetic aortic valve, normally functioning,    VTE ppx: Heparin SUBQ    Dispo: Pending ID consult, pt's wife going to ED today to r/o influenza, flight booked 1/7/19 @ 8PM.     Case d/w Dr. Jean

## 2019-01-05 NOTE — PROGRESS NOTE ADULT - PROBLEM SELECTOR PLAN 1
Pt. w/ hx of AVR (Porcine valve) 6yrs ago in [place].  Pt. endorses fever, chills and productive cough two days.  Pt. took his own Levofloxacin 500mg PO X2 for symptoms 2 days.    F/U blood cultures, labs, Echocardiogram, (pt. refusing OPAL) and RVP panel.  Pt. is to be ruled out for Endocarditis Pt with fever, chills and productive cough X2 days and found be tachycardic and febrile (Tmax 102.3 on 1/4), r/i SIRS criteria, found to be influenza A positive with concern for possible endocarditis given h/o AVR (Porcine valve) in 2013 in [place].   - WBC WNL, ESR WNL, CRP 6.5, lactic acid WNL, d-dimer neg, trop neg x2  - Pt took his own Levofloxacin 500mg PO X2 for symptoms 2 days.    - Pt received vanc x 1 dose in ED at [facility] on 1/4  - Pt received NS @ 100 cc/hr 4 hours and then received IV NS @ 75 cc/hr x 10 hours o/n  - BCx 1/4 NGTD, sent repeat BCx 1/5, plan to send additional set tomorrow as per ID recommendations, UCx NGTD  - TTE on 1/4 revealed normal left and right ventricular size and systolic function,  LVEF 52%, bioprosthetic aortic valve, normally functioning, mild symmetric LVH, no  segmental wall motion abnormalities  - Pt adamantly refusing OPAL and says he will complete this back in [place]  - f/u with ID if OPAL warranted from ID standpoint, optimal duration of hospital stay given that the cultures were just done, ?whether to continue Abx  - c/w Tamiflu 75 mg BID x 5 days (started 1/4) Pt with fever, chills and productive cough X2 days and found be tachycardic and febrile (Tmax 102.3 on 1/4), r/i SIRS criteria, found to be influenza A positive with concern for possible endocarditis given h/o AVR (Porcine valve) in 2013 in [place].   - WBC WNL, ESR WNL, CRP 6.5, lactic acid WNL, d-dimer neg, trop neg x2  - Pt took his own Levofloxacin 500mg PO X2 for symptoms 2 days.    - Pt received vanc x 1 dose in ED at [facility] on 1/4  - Pt received NS @ 100 cc/hr 4 hours and then received IV NS @ 75 cc/hr x 10 hours o/n  - BCx 1/4 NGTD, sent repeat BCx 1/5, plan to send additional set tomorrow as per ID recommendations, UCx NGTD  - TTE on 1/4 revealed normal left and right ventricular size and systolic function,  LVEF 52%, bioprosthetic aortic valve, normally functioning, mild symmetric LVH, no  segmental wall motion abnormalities  - Pt adamantly refusing OPAL and says he will complete this back in [place]  - f/u with ID if OPAL warranted from ID standpoint, optimal duration of hospital stay given that the cultures were just done, ?whether to continue Abx  - c/w Tamiflu 75 mg BID x 5 days (started 1/4). No abx at this time as per Dr. Jean. Pending ID recommendations.

## 2019-01-05 NOTE — CONSULT NOTE ADULT - SUBJECTIVE AND OBJECTIVE BOX
HPI:  Anguillan  # [id number]    49 y/o Anguillan speaking male visiting from [place] with PMHx of Congenital Aortic Valve defect (s/p AVR w/ porcine valve 6yrs ago in [place]) presents as a transfer from [facility] this evening, 1/3/19, to [facility] w/ subjective fever (Tmax 102.F yesterday evening, 1/2/19), chills and productive cough X2 days, pt. noted to be Tachycardic 's, r/o Endocarditis.   According to patient, he was in his usual good state of health until Wednesday evening, he felt congestive, with productive cough, fever (pt. took oral temperature 102F) and chills.  Pt. took Levofloxacin 500mg PO q12 starting Wednesday evening, pt. states he carries antibiotic with him ever since he had AVR as prophylaxis measure in case he has to go to dentist or has URI.    Pt. states on Thursday morning he still felt feverish, took another Levofloxacin 500mg PO X1 hoping symptoms would resolve right away. Later in the afternoon pt. became anxious since symptoms have not resolved and went to [facility] for further evaluation. Blood cultures were sent and have been no growth to date. He was found to have the flu and started on tamiflu. His fevers have resolved.  He denies any SOB, runny nose, cough    ID consulted to evaluate for endocarditis.        PAST MEDICAL & SURGICAL HISTORY:  Congenital aortic valve anomaly: s/p AVR (Porcine valve 6 yrs ago) in [place]  Valvular disease: cardiac valve  S/P appendectomy  S/P cholecystectomy  History of mitral valve replacement        REVIEW OF SYSTEMS:    General:	 no weakness; + fevers, no chills  Skin/Breast: no rash  Respiratory and Thorax: no SOB, + cough  Cardiovascular:	No chest pain  Gastrointestinal:	 no nausea, vomiting , diarrhea  Genitourinary:	no dysuria, no difficulty urinating, no hematuria  Musculoskeletal:	no weakness, no joint swelling/pain  Neurological:	no focal weakness/numbness  Endocrine:	no polyuria, no polydipsia      ANTIBIOTICS:  MEDICATIONS  (STANDING):  acyclovir Topical 5% Ointment 1 Application(s) Topical five times a day  benzocaine 15 mG/menthol 3.6 mG (Sugar-Free) Lozenge 1 Lozenge Oral three times a day  heparin  Injectable 5000 Unit(s) SubCutaneous every 8 hours  oseltamivir 75 milliGRAM(s) Oral two times a day  sodium chloride 0.9%. 1000 milliLiter(s) (100 mL/Hr) IV Continuous <Continuous>  sodium chloride 0.9%. 1000 milliLiter(s) (75 mL/Hr) IV Continuous <Continuous>  sodium chloride 0.9%. 500 milliLiter(s) (100 mL/Hr) IV Continuous <Continuous>    MEDICATIONS  (PRN):  acetaminophen   Tablet .. 650 milliGRAM(s) Oral every 6 hours PRN Temp greater or equal to 38C (100.4F), Moderate Pain (4 - 6)  guaiFENesin    Syrup 100 milliGRAM(s) Oral every 6 hours PRN Cough      Allergies    beta lactamase inhibitors (Anaphylaxis)  cephalosporins (Anaphylaxis)  macrolide antibiotics (Anaphylaxis)  penicillin (Anaphylaxis)    Intolerances        SOCIAL HISTORY:    FAMILY HISTORY:  No pertinent family history in first degree relatives      Vital Signs Last 24 Hrs  T(C): 37.4 (05 Jan 2019 14:09), Max: 38.9 (04 Jan 2019 22:05)  T(F): 99.4 (05 Jan 2019 14:09), Max: 102 (04 Jan 2019 22:05)  HR: 93 (05 Jan 2019 12:29) (93 - 116)  BP: 143/85 (05 Jan 2019 12:29) (136/84 - 155/87)  BP(mean): --  RR: 18 (05 Jan 2019 12:29) (18 - 20)  SpO2: 95% (05 Jan 2019 12:29) (94% - 95%)    PHYSICAL EXAM:  Constitutional:  non-toxic, no distress  Eyes:  no icterus   Ear/Nose/Throat: no oral lesion, no sinus tenderness on percussion	  Neck:no JVD, no lymphadenopathy, supple  Respiratory: CTA gisella  Cardiovascular: S1S2 RRR, no murmurs  Gastrointestinal:soft, (+) BS, no HSM  Extremities:no edema   Vascular: DP Pulse:	right normal; left normal            LABS:                        16.5   6.5   )-----------( 173      ( 05 Jan 2019 06:13 )             48.2     01-05    137  |  98  |  9   ----------------------------<  201<H>  3.9   |  24  |  0.82    Ca    9.0      05 Jan 2019 06:13  Mg     1.8     01-05    TPro  6.6  /  Alb  4.1  /  TBili  0.6  /  DBili  <0.2  /  AST  29  /  ALT  49<H>  /  AlkPhos  64  01-04    PT/INR - ( 04 Jan 2019 06:56 )   PT: 12.6 sec;   INR: 1.11          PTT - ( 04 Jan 2019 06:56 )  PTT:30.0 sec  Urinalysis Basic - ( 03 Jan 2019 19:45 )    Color: Yellow / Appearance: Clear / SG: >=1.030 / pH: x  Gluc: x / Ketone: NEGATIVE  / Bili: NEGATIVE / Urobili: 0.2 E.U./dL   Blood: x / Protein: NEGATIVE mg/dL / Nitrite: NEGATIVE   Leuk Esterase: NEGATIVE / RBC: x / WBC x   Sq Epi: x / Non Sq Epi: x / Bacteria: x        MICROBIOLOGY:    Culture - Blood (01.04.19 @ 00:32)    Specimen Source: .Blood Blood-Peripheral    Culture Results:   No growth to date.    Culture - Blood (01.04.19 @ 00:32)    Specimen Source: .Blood Blood-Peripheral    Culture Results:   No growth to date.      RVP:  positive influenza     RADIOLOGY & ADDITIONAL STUDIES:    < from: Xray Chest 2 Views PA/Lat (01.03.19 @ 19:02) >  : No acute infiltrates    < from: Echocardiogram (01.04.19 @ 12:12) >  CONCLUSIONS:1. Normal left and right ventricular size and systolic   function.2. Bioprosthetic aortic valve, normally functioning.    < end of copied text >

## 2019-01-05 NOTE — PROGRESS NOTE ADULT - ASSESSMENT
49 y/o Slovak speaking male visiting from [place] (supposed to fly back on Sunday, January 6, 2019) with PMHx of Congenital Aortic Valve defect (s/p AVR w/ porcine valve 6yrs ago in [place]) presents as a transfer from [facility] this evening, 1/3/19, to [address] w/ subjective fever (Tmax 102.F yesterday evening, 1/2/19), chills and productive cough X2 days, pt. noted to be Tachycardic 's, r/o Endocarditis.   Blood cultures drawn f/u results (for r/o Endocarditis), pt. was given Vancomycin 1500mg IV X1.  Pt. was explained he will need OPAL; pt. is refusing.   Pt. transferred to [address] for telemetry, ECG, Echocardiogram, RVP panel and NPO for further workup to r/o Endocarditis.  Pt. remains afebrile and states he is feeling better. Continue Vancomycin 1500mg IV bid in AM.  F/U RVP panel.  Discuss further with Dr. Anne in AM. 49 y/o Chinese speaking male visiting from [place] (supposed to fly back on Monday 1/7/19 @ 8PM) with PMHx of congenital aortic valve defect s/p AVR w/ porcine valve in 2013 in [place] who presented as a transfer from [facility] 1/3/19 w/ fever, chills and productive cough X2 days and found to be tachycardic ('s), Tmax 102.3F on 1/4, 102F 1/4-1/5 overnight, found to be positive for Influenza A (started on tamiflu on 1/4 for 5 day course), concern for endocarditis given AVR in 2013, BCx x2 sent, initial plan for OPAL on Monday; however pt refusing and awaiting ID recommendations.

## 2019-01-05 NOTE — CONSULT NOTE ADULT - ASSESSMENT
IMPRESSION:  Fevers are likely due to influenza virus.  His bioprosthetic aortic valve does put him at an increased risk of endocarditis and in the setting of fever this should be evaluated for.  As of now he has 2 minor criteria (fever, predisposing heart condition).  Blood cultures are negative x 24 hours however they may be falsely negative since he was on levaquin x 2 days.  It is a broad spectrum antibiotic with staph and strep coverage and could have cleared the blood cultures.  TTE is also free of vegetations however a OPAL would have higher yield.    Recommend:  1.  Continue tamiflu 75 mg PO q12 x 5 days  2.  Continue droplet and contact precautions  3.  Hold systemic antibiotics  4.  Follow up blood cultures   5.  Check OAPL to evaluate heart valves

## 2019-01-06 DIAGNOSIS — E11.9 TYPE 2 DIABETES MELLITUS WITHOUT COMPLICATIONS: ICD-10-CM

## 2019-01-06 LAB
ANION GAP SERPL CALC-SCNC: 10 MMOL/L — SIGNIFICANT CHANGE UP (ref 5–17)
BUN SERPL-MCNC: 10 MG/DL — SIGNIFICANT CHANGE UP (ref 7–23)
CALCIUM SERPL-MCNC: 9.8 MG/DL — SIGNIFICANT CHANGE UP (ref 8.4–10.5)
CHLORIDE SERPL-SCNC: 100 MMOL/L — SIGNIFICANT CHANGE UP (ref 96–108)
CK MB CFR SERPL CALC: <1 NG/ML — SIGNIFICANT CHANGE UP (ref 0–6.7)
CK SERPL-CCNC: 124 U/L — SIGNIFICANT CHANGE UP (ref 30–200)
CO2 SERPL-SCNC: 29 MMOL/L — SIGNIFICANT CHANGE UP (ref 22–31)
CREAT SERPL-MCNC: 0.93 MG/DL — SIGNIFICANT CHANGE UP (ref 0.5–1.3)
GLUCOSE SERPL-MCNC: 199 MG/DL — HIGH (ref 70–99)
HBA1C BLD-MCNC: 8.1 % — HIGH (ref 4–5.6)
HCT VFR BLD CALC: 49.5 % — SIGNIFICANT CHANGE UP (ref 39–50)
HGB BLD-MCNC: 17.2 G/DL — HIGH (ref 13–17)
LACTATE SERPL-SCNC: 1.1 MMOL/L — SIGNIFICANT CHANGE UP (ref 0.5–2)
MAGNESIUM SERPL-MCNC: 2 MG/DL — SIGNIFICANT CHANGE UP (ref 1.6–2.6)
MCHC RBC-ENTMCNC: 31.3 PG — SIGNIFICANT CHANGE UP (ref 27–34)
MCHC RBC-ENTMCNC: 34.7 G/DL — SIGNIFICANT CHANGE UP (ref 32–36)
MCV RBC AUTO: 90.2 FL — SIGNIFICANT CHANGE UP (ref 80–100)
PLATELET # BLD AUTO: 201 K/UL — SIGNIFICANT CHANGE UP (ref 150–400)
POTASSIUM SERPL-MCNC: 4.3 MMOL/L — SIGNIFICANT CHANGE UP (ref 3.5–5.3)
POTASSIUM SERPL-SCNC: 4.3 MMOL/L — SIGNIFICANT CHANGE UP (ref 3.5–5.3)
RBC # BLD: 5.49 M/UL — SIGNIFICANT CHANGE UP (ref 4.2–5.8)
RBC # FLD: 12.4 % — SIGNIFICANT CHANGE UP (ref 10.3–16.9)
SODIUM SERPL-SCNC: 139 MMOL/L — SIGNIFICANT CHANGE UP (ref 135–145)
TROPONIN T SERPL-MCNC: <0.01 NG/ML — SIGNIFICANT CHANGE UP (ref 0–0.01)
WBC # BLD: 4.7 K/UL — SIGNIFICANT CHANGE UP (ref 3.8–10.5)
WBC # FLD AUTO: 4.7 K/UL — SIGNIFICANT CHANGE UP (ref 3.8–10.5)

## 2019-01-06 PROCEDURE — 99232 SBSQ HOSP IP/OBS MODERATE 35: CPT

## 2019-01-06 RX ORDER — DEXTROSE 50 % IN WATER 50 %
15 SYRINGE (ML) INTRAVENOUS ONCE
Qty: 0 | Refills: 0 | Status: DISCONTINUED | OUTPATIENT
Start: 2019-01-06 | End: 2019-01-07

## 2019-01-06 RX ORDER — DEXTROSE 50 % IN WATER 50 %
25 SYRINGE (ML) INTRAVENOUS ONCE
Qty: 0 | Refills: 0 | Status: DISCONTINUED | OUTPATIENT
Start: 2019-01-06 | End: 2019-01-07

## 2019-01-06 RX ORDER — SODIUM CHLORIDE 9 MG/ML
1000 INJECTION, SOLUTION INTRAVENOUS
Qty: 0 | Refills: 0 | Status: DISCONTINUED | OUTPATIENT
Start: 2019-01-06 | End: 2019-01-07

## 2019-01-06 RX ORDER — DEXTROSE 50 % IN WATER 50 %
12.5 SYRINGE (ML) INTRAVENOUS ONCE
Qty: 0 | Refills: 0 | Status: DISCONTINUED | OUTPATIENT
Start: 2019-01-06 | End: 2019-01-07

## 2019-01-06 RX ORDER — GLUCAGON INJECTION, SOLUTION 0.5 MG/.1ML
1 INJECTION, SOLUTION SUBCUTANEOUS ONCE
Qty: 0 | Refills: 0 | Status: DISCONTINUED | OUTPATIENT
Start: 2019-01-06 | End: 2019-01-07

## 2019-01-06 RX ORDER — INSULIN LISPRO 100/ML
VIAL (ML) SUBCUTANEOUS
Qty: 0 | Refills: 0 | Status: DISCONTINUED | OUTPATIENT
Start: 2019-01-06 | End: 2019-01-07

## 2019-01-06 RX ADMIN — Medication 100 MILLIGRAM(S): at 21:47

## 2019-01-06 RX ADMIN — Medication 1: at 16:48

## 2019-01-06 RX ADMIN — HEPARIN SODIUM 5000 UNIT(S): 5000 INJECTION INTRAVENOUS; SUBCUTANEOUS at 21:49

## 2019-01-06 RX ADMIN — Medication 4: at 21:47

## 2019-01-06 RX ADMIN — ACYCLOVIR 1 APPLICATION(S): 50 OINTMENT TOPICAL at 11:39

## 2019-01-06 RX ADMIN — Medication 100 MILLIGRAM(S): at 07:32

## 2019-01-06 RX ADMIN — Medication 100 MILLIGRAM(S): at 14:33

## 2019-01-06 RX ADMIN — ACYCLOVIR 1 APPLICATION(S): 50 OINTMENT TOPICAL at 16:50

## 2019-01-06 RX ADMIN — BENZOCAINE AND MENTHOL 1 LOZENGE: 5; 1 LIQUID ORAL at 07:34

## 2019-01-06 RX ADMIN — HEPARIN SODIUM 5000 UNIT(S): 5000 INJECTION INTRAVENOUS; SUBCUTANEOUS at 07:32

## 2019-01-06 RX ADMIN — HEPARIN SODIUM 5000 UNIT(S): 5000 INJECTION INTRAVENOUS; SUBCUTANEOUS at 14:32

## 2019-01-06 RX ADMIN — ACYCLOVIR 1 APPLICATION(S): 50 OINTMENT TOPICAL at 07:34

## 2019-01-06 RX ADMIN — BENZOCAINE AND MENTHOL 1 LOZENGE: 5; 1 LIQUID ORAL at 14:34

## 2019-01-06 RX ADMIN — Medication 75 MILLIGRAM(S): at 21:47

## 2019-01-06 RX ADMIN — Medication 2: at 11:40

## 2019-01-06 RX ADMIN — ACYCLOVIR 1 APPLICATION(S): 50 OINTMENT TOPICAL at 20:30

## 2019-01-06 RX ADMIN — Medication 75 MILLIGRAM(S): at 07:32

## 2019-01-06 NOTE — PROGRESS NOTE ADULT - REASON FOR ADMISSION
r/o endocarditis
Productive cough w/ fever and chills (subjective Tmax 102F yesterday evening) X2 days.  Pt. is visiting from [place] until Sunday, January 6, 2019
Productive cough w/ fever and chills (subjective Tmax 102F yesterday evening) X2 days.  Pt. is visiting from [place] until Sunday, January 6, 2019

## 2019-01-06 NOTE — PROGRESS NOTE ADULT - PROBLEM SELECTOR PLAN 2
AVR (Porcine valve) in 2013 in [place].    - Pt states he carries Levofloxacin as prophylaxis measure, in case he goes to dentist or has URI.  - TTE revealed bioprosthetic aortic valve, normally functioning,

## 2019-01-06 NOTE — PROGRESS NOTE ADULT - SUBJECTIVE AND OBJECTIVE BOX
Interventional Cardiology PA Adult Progress Note    CC: productive cough and chills upon admit    Subjective Assessment:  Pt seen and examined at bedside. Pt reports he is feeling better and looking forward to being d/c tomorrow AM for his flight back to [place] at 8am.  Denies f/c, CP, SOB, dizziness, palpitations, N/V.     12 Point ROS Negative except as per Subjective and HPI  	  MEDICATIONS:    oseltamivir 75 milliGRAM(s) Oral two times a day  guaiFENesin    Syrup 100 milliGRAM(s) Oral every 6 hours PRN  acetaminophen   Tablet .. 650 milliGRAM(s) Oral every 6 hours PRN  acyclovir Topical 5% Ointment 1 Application(s) Topical five times a day  benzocaine 15 mG/menthol 3.6 mG (Sugar-Free) Lozenge 1 Lozenge Oral three times a day  heparin  Injectable 5000 Unit(s) SubCutaneous every 8 hours  sodium chloride 0.9%. 1000 milliLiter(s) IV Continuous <Continuous>  sodium chloride 0.9%. 1000 milliLiter(s) IV Continuous <Continuous>  sodium chloride 0.9%. 500 milliLiter(s) IV Continuous <Continuous>      PHYSICAL EXAM:  TELEMETRY: No acute events noted  T(C): 36.5 (01-06-19 @ 10:47), Max: 37.4 (01-05-19 @ 14:09)  HR: 91 (01-06-19 @ 08:30) (78 - 100)  BP: 133/77 (01-06-19 @ 08:30) (121/76 - 143/85)  RR: 16 (01-06-19 @ 08:30) (15 - 18)  SpO2: 98% (01-06-19 @ 08:30) (95% - 98%)  Wt(kg): --  I&O's Summary    05 Jan 2019 07:01  -  06 Jan 2019 07:00  --------------------------------------------------------  IN: 600 mL / OUT: 200 mL / NET: 400 mL    06 Jan 2019 07:01  -  06 Jan 2019 11:06  --------------------------------------------------------  IN: 180 mL / OUT: 0 mL / NET: 180 mL                                          Appearance: NAD  HEENT: MMM, PERRL, EOMI	  Neck: Supple, - JVD; No carotid bruit   Cardiovascular: Normal S1 S2, No JVD, No murmurs,   Respiratory: Lungs clear to auscultation, No Rales, Rhonchi, Wheezing	  Gastrointestinal:  Soft, Non-tender, + BS	  Skin: No rashes, No ecchymoses, No cyanosis  Extremities: Normal range of motion, No clubbing, cyanosis or edema  Vascular: Peripheral pulses palpable 2+ bilaterally  Neurologic: Non-focal  Psychiatry: A & O x 3, Mood & affect appropriate                              17.2   4.7   )-----------( 201      ( 06 Jan 2019 06:29 )             49.5     01-06    139  |  100  |  10  ----------------------------<  199<H>  4.3   |  29  |  0.93    Ca    9.8      06 Jan 2019 06:29  Mg     2.0     01-06      HgA1c: Hemoglobin A1C, Whole Blood: 8.1 % (01-06 @ 06:29)
Interventional Cardiology PA Adult Progress Note    CC: productive cough and chills upon admit    Subjective Assessment:  Pt seen and examined at bedside. Pt reports he is feeling better but endorses abdominal pain when he coughs.  Denies f/c, CP, SOB, dizziness, palpitations, N/V. All other 12 point review of systems otherwise negative except per subjective.  	  MEDICATIONS:  oseltamivir 75 milliGRAM(s) Oral two times a day  guaiFENesin    Syrup 100 milliGRAM(s) Oral every 6 hours PRN  acetaminophen   Tablet .. 650 milliGRAM(s) Oral every 6 hours PRN  acyclovir Topical 5% Ointment 1 Application(s) Topical five times a day  benzocaine 15 mG/menthol 3.6 mG (Sugar-Free) Lozenge 1 Lozenge Oral three times a day  heparin  Injectable 5000 Unit(s) SubCutaneous every 8 hours  sodium chloride 0.9%. 1000 milliLiter(s) IV Continuous <Continuous>  sodium chloride 0.9%. 1000 milliLiter(s) IV Continuous <Continuous>  sodium chloride 0.9%. 500 milliLiter(s) IV Continuous <Continuous>      [PHYSICAL EXAM:  TELEMETRY:  T(C): 37.4 (01-05-19 @ 09:55), Max: 39.1 (01-04-19 @ 14:15)  HR: 93 (01-05-19 @ 12:29) (93 - 120)  BP: 143/85 (01-05-19 @ 12:29) (136/84 - 165/88)  RR: 18 (01-05-19 @ 12:29) (16 - 20)  SpO2: 95% (01-05-19 @ 12:29) (94% - 95%)  Wt(kg): --  I&O's Summary    04 Jan 2019 07:01  -  05 Jan 2019 07:00  --------------------------------------------------------  IN: 1265 mL / OUT: 2100 mL / NET: -835 mL    05 Jan 2019 07:01  -  05 Jan 2019 12:36  --------------------------------------------------------  IN: 180 mL / OUT: 0 mL / NET: 180 mL                             Appearance: WD/WN in NAD laying comfortable, multiple tattoos along left arm. 	  HEENT:   Normal oral mucosa, PERRL, EOMI	  Neck: Supple, - JVD  Cardiovascular: Normal S1 S2, MANUELITO auscultated best at aortic area, II/XI  Respiratory:  CTA B/L, no w/r/r	  Gastrointestinal:  Soft, Non-tender, + BS x4	  Skin: No rashes, No ecchymoses, No cyanosis  Extremities: Normal range of motion, no c/c/e  Vascular: DP/PT 2+ B/L, radial 2+ B/L  Neurologic: Non-focal  Psychiatry: A & O x 3, Mood & affect appropriate                            16.5   6.5   )-----------( 173      ( 05 Jan 2019 06:13 )             48.2     01-05    137  |  98  |  9   ----------------------------<  201<H>  3.9   |  24  |  0.82    Ca    9.0      05 Jan 2019 06:13  Mg     1.8     01-05    TPro  6.6  /  Alb  4.1  /  TBili  0.6  /  DBili  <0.2  /  AST  29  /  ALT  49<H>  /  AlkPhos  64  01-04  PT/INR - ( 04 Jan 2019 06:56 )   PT: 12.6 sec;   INR: 1.11     PTT - ( 04 Jan 2019 06:56 )  PTT:30.0 sec
CHIEF COMPLAINT: r/o endocarditis     HISTORY OF PRESENT ILLNESS: 49 y/o presented for evaluation of endocarditis. Noted to be febrile.     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 650 milliGRAM(s) Oral every 6 hours PRN        acyclovir Topical 5% Ointment 1 Application(s) Topical five times a day  benzocaine 15 mG/menthol 3.6 mG (Sugar-Free) Lozenge 1 Lozenge Oral three times a day  heparin  Injectable 5000 Unit(s) SubCutaneous every 8 hours  sodium chloride 0.9%. 1000 milliLiter(s) IV Continuous <Continuous>  sodium chloride 0.9%. 1000 milliLiter(s) IV Continuous <Continuous>  sodium chloride 0.9%. 500 milliLiter(s) IV Continuous <Continuous>    FAMILY HISTORY:  No pertinent family history in first degree relatives    SOCIAL HISTORY:  no EtOH, tobacco or drug use    REVIEW OF SYSTEMS:  CONSTITUTIONAL: No fever, weight loss, or fatigue  EYES: No eye pain, visual disturbances, or discharge  ENMT:  No difficulty hearing, tinnitus, vertigo; No sinus or throat pain  NECK: No pain or stiffness  BREASTS: No pain, masses, or nipple discharge  RESPIRATORY: No cough, wheezing, chills or hemoptysis; No Shortness of Breath  CARDIOVASCULAR: No chest pain, palpitations, dizziness, or leg swelling  GASTROINTESTINAL: No abdominal or epigastric pain. No nausea, vomiting, or hematemesis; No diarrhea or constipation. No melena or hematochezia.  GENITOURINARY: No dysuria, frequency, hematuria, or incontinence  NEUROLOGICAL: No headaches, memory loss, loss of strength, numbness, or tremors  SKIN: No itching, burning, rashes, or lesions   LYMPH Nodes: No enlarged glands  ENDOCRINE: No heat or cold intolerance; No hair loss  MUSCULOSKELETAL: No joint pain or swelling; No muscle, back, or extremity pain  PSYCHIATRIC: No depression, anxiety, mood swings, or difficulty sleeping  HEME/LYMPH: No easy bruising, or bleeding gums  ALLERY AND IMMUNOLOGIC: No hives or eczema	      PHYSICAL EXAM:  T(C): 37.4 (01-05-19 @ 09:55), Max: 39.1 (01-04-19 @ 14:15)  HR: 100 (01-05-19 @ 08:00) (94 - 120)  BP: 136/84 (01-05-19 @ 08:00) (136/84 - 165/88)  RR: 18 (01-05-19 @ 08:00) (16 - 20)  SpO2: 95% (01-05-19 @ 08:00) (94% - 95%)  Wt(kg): --  I&O's Summary    04 Jan 2019 07:01  -  05 Jan 2019 07:00  --------------------------------------------------------  IN: 1265 mL / OUT: 2100 mL / NET: -835 mL    05 Jan 2019 07:01  -  05 Jan 2019 12:25  --------------------------------------------------------  IN: 180 mL / OUT: 0 mL / NET: 180 mL      Appearance: Normal	  HEENT:   Normal oral mucosa, PERRL, EOMI	  Lymphatic: No lymphadenopathy  Cardiovascular: Normal S1 S2, No JVD, No murmurs, No edema  Respiratory: Lungs clear to auscultation	  Psychiatry: A & O x 3, Mood & affect appropriate  Gastrointestinal:  Soft, Non-tender, + BS	  Skin: No rashes, No ecchymoses, No cyanosis	  Neurologic: Non-focal  Extremities: Normal range of motion, No clubbing, cyanosis or edema  Vascular: Peripheral pulses palpable 2+ bilaterally  	    ECG:  	sr no st-t changes    LABS:	 	  CARDIAC MARKERS:                        16.5   6.5   )-----------( 173      ( 05 Jan 2019 06:13 )             48.2     01-05    137  |  98  |  9   ----------------------------<  201<H>  3.9   |  24  |  0.82    Ca    9.0      05 Jan 2019 06:13  Mg     1.8     01-05    TPro  6.6  /  Alb  4.1  /  TBili  0.6  /  DBili  <0.2  /  AST  29  /  ALT  49<H>  /  AlkPhos  64  01-04        ASSESSMENT/PLAN: 	49 y/o male with fever, s/p AVR  1. patient seen and examined, chart reviewed  2. echo findings noted  3. cultures repeated  4. patient declines OPAL and requests discharge on SUN  5. ID consult questions are 1. is OPAL warranted from ID standpoint. 2. optimal duration of hospital stay given that the cultures were just done    I spent 45 min in care of this patient

## 2019-01-06 NOTE — PROGRESS NOTE ADULT - ASSESSMENT
49 y/o Syriac speaking male visiting from [place] (supposed to fly back on Monday 1/7/19 @ 8PM) with PMHx of congenital aortic valve defect s/p AVR w/ porcine valve in 2013 in [place] who presented as a transfer from [facility] 1/3/19 w/ fever, chills and productive cough X2 days and found to be tachycardic ('s), Tmax 102.3F on 1/4, 102F 1/4-1/5 overnight, found to be positive for Influenza A (started on tamiflu on 1/4 for 5 day course), concern for endocarditis given AVR in 2013, BCx on 1/4 and 1/5 with NGTD. Surveillance BCx sent today. Initial plan for OPAL on Monday; however pt refusing and awaiting ID recommendations.

## 2019-01-06 NOTE — PROGRESS NOTE ADULT - PROBLEM SELECTOR PLAN 3
- Newly dx DM by HgbA1c 8.1 on 1/6/19  - Start FS/ISS  - As per Dr. Jean, start pt on Metformin 500mg BID on d/c    VTE ppx: Heparin SUBQ  Dispo: D/c in AM if BCx negative as pt adamently refusing OPAL, flight booked 1/7/19 @ 8PM. Make sure to document in d/c summary that pt adamently refusing OPAL    Case d/w Dr. Jean

## 2019-01-06 NOTE — PROGRESS NOTE ADULT - PROBLEM SELECTOR PLAN 1
Pt with fever, chills and productive cough X2 days and found be tachycardic and febrile (Tmax 102.3 on 1/4), r/i SIRS criteria, found to be influenza A positive with concern for possible endocarditis given h/o AVR (Porcine valve) in 2013 in [place].   - WBC WNL, ESR WNL, CRP 6.5, lactic acid WNL, d-dimer neg, trop neg x 3  - Pt took his own Levofloxacin 500mg PO X2 for symptoms 2 days.    - Pt received vanc x 1 dose in ED at [facility] on 1/4  - Pt received NS @ 100 cc/hr 4 hours and then received IV NS @ 75 cc/hr x 10 hours o/n  - BCx 1/4 NGTD, sent repeat BCx 1/5 NGTD. BCx sent today, f/u results. UCx neg  - TTE on 1/4 revealed normal left and right ventricular size and systolic function,  LVEF 52%, bioprosthetic aortic valve, normally functioning, mild symmetric LVH, no  segmental wall motion abnormalities  - Pt adamantly refusing OPAL and says he will complete this back in [place]  - c/w Tamiflu 75 mg BID x 5 days (started 1/4). No abx at this time as per Dr. Jean or ID

## 2019-01-07 VITALS — TEMPERATURE: 97 F

## 2019-01-07 LAB
ANION GAP SERPL CALC-SCNC: 13 MMOL/L — SIGNIFICANT CHANGE UP (ref 5–17)
BUN SERPL-MCNC: 14 MG/DL — SIGNIFICANT CHANGE UP (ref 7–23)
CALCIUM SERPL-MCNC: 9.7 MG/DL — SIGNIFICANT CHANGE UP (ref 8.4–10.5)
CHLORIDE SERPL-SCNC: 99 MMOL/L — SIGNIFICANT CHANGE UP (ref 96–108)
CO2 SERPL-SCNC: 26 MMOL/L — SIGNIFICANT CHANGE UP (ref 22–31)
CREAT SERPL-MCNC: 0.77 MG/DL — SIGNIFICANT CHANGE UP (ref 0.5–1.3)
EOSINOPHIL NFR BLD AUTO: 1 % — SIGNIFICANT CHANGE UP (ref 0–6)
GLUCOSE SERPL-MCNC: 193 MG/DL — HIGH (ref 70–99)
HCT VFR BLD CALC: 46.7 % — SIGNIFICANT CHANGE UP (ref 39–50)
HGB BLD-MCNC: 16.2 G/DL — SIGNIFICANT CHANGE UP (ref 13–17)
LYMPHOCYTES # BLD AUTO: 61 % — HIGH (ref 13–44)
MAGNESIUM SERPL-MCNC: 2.1 MG/DL — SIGNIFICANT CHANGE UP (ref 1.6–2.6)
MCHC RBC-ENTMCNC: 30.4 PG — SIGNIFICANT CHANGE UP (ref 27–34)
MCHC RBC-ENTMCNC: 34.7 G/DL — SIGNIFICANT CHANGE UP (ref 32–36)
MCV RBC AUTO: 87.6 FL — SIGNIFICANT CHANGE UP (ref 80–100)
MONOCYTES NFR BLD AUTO: 8 % — SIGNIFICANT CHANGE UP (ref 2–14)
NEUTROPHILS NFR BLD AUTO: 28 % — LOW (ref 43–77)
PLATELET # BLD AUTO: 226 K/UL — SIGNIFICANT CHANGE UP (ref 150–400)
POTASSIUM SERPL-MCNC: 4 MMOL/L — SIGNIFICANT CHANGE UP (ref 3.5–5.3)
POTASSIUM SERPL-SCNC: 4 MMOL/L — SIGNIFICANT CHANGE UP (ref 3.5–5.3)
RBC # BLD: 5.33 M/UL — SIGNIFICANT CHANGE UP (ref 4.2–5.8)
RBC # FLD: 12.2 % — SIGNIFICANT CHANGE UP (ref 10.3–16.9)
SODIUM SERPL-SCNC: 138 MMOL/L — SIGNIFICANT CHANGE UP (ref 135–145)
WBC # BLD: 4.6 K/UL — SIGNIFICANT CHANGE UP (ref 3.8–10.5)
WBC # FLD AUTO: 4.6 K/UL — SIGNIFICANT CHANGE UP (ref 3.8–10.5)

## 2019-01-07 PROCEDURE — 87581 M.PNEUMON DNA AMP PROBE: CPT

## 2019-01-07 PROCEDURE — 83036 HEMOGLOBIN GLYCOSYLATED A1C: CPT

## 2019-01-07 PROCEDURE — 93005 ELECTROCARDIOGRAM TRACING: CPT

## 2019-01-07 PROCEDURE — 80202 ASSAY OF VANCOMYCIN: CPT

## 2019-01-07 PROCEDURE — 99239 HOSP IP/OBS DSCHRG MGMT >30: CPT

## 2019-01-07 PROCEDURE — 87633 RESP VIRUS 12-25 TARGETS: CPT

## 2019-01-07 PROCEDURE — 82550 ASSAY OF CK (CPK): CPT

## 2019-01-07 PROCEDURE — 84484 ASSAY OF TROPONIN QUANT: CPT

## 2019-01-07 PROCEDURE — 85379 FIBRIN DEGRADATION QUANT: CPT

## 2019-01-07 PROCEDURE — 85610 PROTHROMBIN TIME: CPT

## 2019-01-07 PROCEDURE — 82962 GLUCOSE BLOOD TEST: CPT

## 2019-01-07 PROCEDURE — 80061 LIPID PANEL: CPT

## 2019-01-07 PROCEDURE — 86140 C-REACTIVE PROTEIN: CPT

## 2019-01-07 PROCEDURE — 83615 LACTATE (LD) (LDH) ENZYME: CPT

## 2019-01-07 PROCEDURE — 87486 CHLMYD PNEUM DNA AMP PROBE: CPT

## 2019-01-07 PROCEDURE — 99285 EMERGENCY DEPT VISIT HI MDM: CPT | Mod: 25

## 2019-01-07 PROCEDURE — 36415 COLL VENOUS BLD VENIPUNCTURE: CPT

## 2019-01-07 PROCEDURE — 85730 THROMBOPLASTIN TIME PARTIAL: CPT

## 2019-01-07 PROCEDURE — 71046 X-RAY EXAM CHEST 2 VIEWS: CPT

## 2019-01-07 PROCEDURE — 96365 THER/PROPH/DIAG IV INF INIT: CPT

## 2019-01-07 PROCEDURE — 80053 COMPREHEN METABOLIC PANEL: CPT

## 2019-01-07 PROCEDURE — 83735 ASSAY OF MAGNESIUM: CPT

## 2019-01-07 PROCEDURE — 82553 CREATINE MB FRACTION: CPT

## 2019-01-07 PROCEDURE — 83605 ASSAY OF LACTIC ACID: CPT

## 2019-01-07 PROCEDURE — 80076 HEPATIC FUNCTION PANEL: CPT

## 2019-01-07 PROCEDURE — 85025 COMPLETE CBC W/AUTO DIFF WBC: CPT

## 2019-01-07 PROCEDURE — 85027 COMPLETE CBC AUTOMATED: CPT

## 2019-01-07 PROCEDURE — 81003 URINALYSIS AUTO W/O SCOPE: CPT

## 2019-01-07 PROCEDURE — 87798 DETECT AGENT NOS DNA AMP: CPT

## 2019-01-07 PROCEDURE — 80048 BASIC METABOLIC PNL TOTAL CA: CPT

## 2019-01-07 PROCEDURE — 85652 RBC SED RATE AUTOMATED: CPT

## 2019-01-07 PROCEDURE — 87040 BLOOD CULTURE FOR BACTERIA: CPT

## 2019-01-07 PROCEDURE — 93306 TTE W/DOPPLER COMPLETE: CPT

## 2019-01-07 PROCEDURE — 96376 TX/PRO/DX INJ SAME DRUG ADON: CPT

## 2019-01-07 PROCEDURE — 87086 URINE CULTURE/COLONY COUNT: CPT

## 2019-01-07 RX ORDER — ACETAMINOPHEN 500 MG
2 TABLET ORAL
Qty: 0 | Refills: 0 | COMMUNITY
Start: 2019-01-07

## 2019-01-07 RX ORDER — METFORMIN HYDROCHLORIDE 850 MG/1
1 TABLET ORAL
Qty: 60 | Refills: 3 | OUTPATIENT
Start: 2019-01-07 | End: 2019-05-06

## 2019-01-07 RX ORDER — CIPROFLOXACIN LACTATE 400MG/40ML
1 VIAL (ML) INTRAVENOUS
Qty: 0 | Refills: 0 | COMMUNITY

## 2019-01-07 RX ORDER — BENZOCAINE AND MENTHOL 5; 1 G/100ML; G/100ML
1 LIQUID ORAL
Qty: 0 | Refills: 0 | COMMUNITY
Start: 2019-01-07

## 2019-01-07 RX ORDER — ACYCLOVIR 50 MG/G
1 OINTMENT TOPICAL
Qty: 0 | Refills: 0 | COMMUNITY
Start: 2019-01-07

## 2019-01-07 RX ADMIN — Medication 1: at 07:28

## 2019-01-07 RX ADMIN — ACYCLOVIR 1 APPLICATION(S): 50 OINTMENT TOPICAL at 06:20

## 2019-01-07 RX ADMIN — HEPARIN SODIUM 5000 UNIT(S): 5000 INJECTION INTRAVENOUS; SUBCUTANEOUS at 06:20

## 2019-01-07 RX ADMIN — Medication 75 MILLIGRAM(S): at 11:11

## 2019-01-07 NOTE — DISCHARGE NOTE ADULT - PLAN OF CARE
You should follow up with your primary care doctor once you return to [place]. You came in to the hospital because of fevers, chills, and cough. While you were here, you tested positive for Influenza (Flu). There was concern for an infection of your heart valve called endocarditis. You refused to have a Transesophageal Echocardiogram (ultrasound of your heart valves) done. Your blood cultures showed no bacterial growth making this disease less likely, but cannot be 100% ruled out without the Transesophageal Echocardiogram. For the flu, you should continue taking Tamiflu (Oseltamivir) 75mg twice daily for an additional 3 days until 1/9/19. Please take Robitussin (Guaifenesin) cough syrup and throat lozenges as directed for cough. Please make sure to stay properly hydrated and return to emergency room with any persistent or worsening symptoms. Your Hemoglobin A1c which measures your average blood sugar over 3 months was 8.1%. A normal Hemoglobin A1c is under 5.7%. This means that you have diabetes. We have started you on Metformin 500mg twice daily to help lower this number. You should see your primary care doctor when you return to [place] to discuss this treatment plan further.

## 2019-01-07 NOTE — DISCHARGE NOTE ADULT - ADDITIONAL INSTRUCTIONS
Please make sure to follow up with your primary care doctor and cardiologist as soon as you return to [place].

## 2019-01-07 NOTE — DISCHARGE NOTE ADULT - HOSPITAL COURSE
49 y/o Kyrgyz speaking male visiting from [place] (supposed to fly back on Sunday, January 6, 2019) with PMHx of Congenital Aortic Valve defect (s/p AVR w/ porcine valve 6yrs ago in [place]) presents as a transfer from [facility] this evening, 1/3/19, to [address] w/ subjective fever (Tmax 102.F yesterday evening, 1/2/19), chills and productive cough X2 days, pt. noted to be Tachycardic 's, r/o Endocarditis.   According to patient, he was in his usual good state of health until Wednesday evening, he felt congestive, with productive cough, fever (pt. took oral temperature 102F) and chills.  Pt. took Levofloxacin 500mg PO X1 Wednesday evening, pt. states he carries antibiotic with him ever since he had AVR as prophylaxis measure in case he has to go to dentist or has URI.    Pt. states on Thursday morning he still felt feverish, took another Levofloxacin 500mg PO X1 hoping symptoms would resolve right away. Later in the afternoon pt. became anxious since symptoms have not resolved and went to [facility] for further evaluation.   Pt. denies, cp, SOB, palpitations, dizziness, abdominal discomfort, N/V and bowel changes. In ER ECG reveals NSR@78 bpm with non specific ST-T wave changes, wbc normal (6.7), Monocyte 15.0, Blood Glucose 206, Temp 99.0F  CXR reveals no acute pathology seen, UA negative.  Blood cultures drawn f/u results (for r/o Endocarditis), pt. was given Vancomycin 1500mg IV X1.  Pt. was explained he will need OPAL; pt. is refusing.   Pt. transferred to [address] for telemetry, ECG, Echocardiogram, RVP panel and NPO for further workup to r/o Endocarditis.  Pt. remains afebrile and states he is feeling better.  During hospital course, pt tested positive for Influenza AH1, Trop negative x 3. 47 y/o German speaking male visiting from [place] (supposed to fly back on Sunday, January 6, 2019) with PMHx of Congenital Aortic Valve defect (s/p AVR w/ porcine valve 6yrs ago in [place]) presents as a transfer from [facility] this evening, 1/3/19, to [address] w/ subjective fever (Tmax 102.F yesterday evening, 1/2/19), chills and productive cough X2 days, pt. noted to be Tachycardic 's, r/o Endocarditis.   According to patient, he was in his usual good state of health until Wednesday evening, he felt congestive, with productive cough, fever (pt. took oral temperature 102F) and chills.  Pt. took Levofloxacin 500mg PO X1 Wednesday evening, pt. states he carries antibiotic with him ever since he had AVR as prophylaxis measure in case he has to go to dentist or has URI.    Pt. states on Thursday morning he still felt feverish, took another Levofloxacin 500mg PO X1 hoping symptoms would resolve right away. Later in the afternoon pt. became anxious since symptoms have not resolved and went to [facility] for further evaluation.   Pt. denies, cp, SOB, palpitations, dizziness, abdominal discomfort, N/V and bowel changes. In ER ECG reveals NSR@78 bpm with non specific ST-T wave changes, wbc normal (6.7), Monocyte 15.0, Blood Glucose 206, Temp 99.0F  CXR reveals no acute pathology seen, UA negative.  Blood cultures drawn f/u results (for r/o Endocarditis), pt. was given Vancomycin 1500mg IV X1.  Pt. was explained he will need OPAL; pt. is refusing.   Pt. transferred to [address] for telemetry, ECG, Echocardiogram, RVP panel and NPO for further workup to r/o Endocarditis.  Pt. remains afebrile and states he is feeling better.  During hospital course, pt tested positive for Influenza AH1, Trop negative x 3, BCx on 1/4, 1/5, and 1/6 with NGTD. TTE on 1/4/19: Normal left and right ventricular size and systolic   function.2. Bioprosthetic aortic valve, normally functioning.  Procedure Details  A complete two-dimensional transthoracic echocardiogram was performed (2D,  M-mode, spectral and color flow doppler).  Study Quality: Fair.  Left Ventricle  Normal left ventricular size and systolic function.  Left ventricular  ejection fraction is calculated at 52%. There is mild symmetric left  ventricular hypertrophy.  No segmental wall motion abnormalities are seen.  Mitral annular tissue Doppler reveals: medial e' 6.6cm/s (E/e' 11.8),   lateral  9.7cm/s (E/e' 8).  Normal left ventricular diastolic function and filling  pressure. 49 y/o Frisian speaking male visiting from [place] (supposed to fly back on Sunday, January 6, 2019) with PMHx of Congenital Aortic Valve defect (s/p AVR w/ porcine valve 6yrs ago in [place]) presents as a transfer from [facility] this evening, 1/3/19, to [address] w/ subjective fever (Tmax 102.F yesterday evening, 1/2/19), chills and productive cough X2 days, pt. noted to be Tachycardic 's, r/o Endocarditis.   According to patient, he was in his usual good state of health until Wednesday evening, he felt congestive, with productive cough, fever (pt. took oral temperature 102F) and chills.  Pt. took Levofloxacin 500mg PO X1 Wednesday evening, pt. states he carries antibiotic with him ever since he had AVR as prophylaxis measure in case he has to go to dentist or has URI.    Pt. states on Thursday morning he still felt feverish, took another Levofloxacin 500mg PO X1 hoping symptoms would resolve right away. Later in the afternoon pt. became anxious since symptoms have not resolved and went to [facility] for further evaluation.   Pt. denies, cp, SOB, palpitations, dizziness, abdominal discomfort, N/V and bowel changes. In ER ECG reveals NSR@78 bpm with non specific ST-T wave changes, wbc normal (6.7), Monocyte 15.0, Blood Glucose 206, Temp 99.0F  CXR reveals no acute pathology seen, UA negative.  Blood cultures drawn f/u results (for r/o Endocarditis), pt. was given Vancomycin 1500mg IV X1.  Pt. was explained he will need OPAL; pt. is refusing.   Pt. transferred to [address] for telemetry, ECG, Echocardiogram, RVP panel and NPO for further workup to r/o Endocarditis.  Pt. remains afebrile and states he is feeling better.  During hospital course, pt tested positive for Influenza AH1, Trop negative x 3, BCx on 1/4, 1/5, and 1/6 with NGTD. TTE on 1/4/19: Normal left and right ventricular size and systolic/diastolic function, EF 52%. 2.Bioprosthetic aortic valve, normally functioning. 3. Mild symmetric LVH, no WMA.  No significant events on telemetry overnight. Patient has been medically cleared for discharge as per Dr. Anne. Patient has been given appropriate discharge instructions including medication regimen and follow up. Medications that patient needs refills on have been e-prescribed to preferred pharmacy.     Temp 97.3 HR 91 /68 RR 18 SpO2 95% on RA  Gen: NAD, A&O x3  Cards: RRR, clear S1 and S2 without murmur  Pulm: CTA B/L without w/r/r  Abd: soft, NT/ND BS+  Ext: no LE edema or ulcerations B/L 47 y/o Mongolian speaking male visiting from [place] (supposed to fly back on Sunday, January 6, 2019) with PMHx of Congenital Aortic Valve defect (s/p AVR w/ porcine valve 6yrs ago in [place]) presents as a transfer from [facility] this evening, 1/3/19, to [address] w/ subjective fever (Tmax 102.F yesterday evening, 1/2/19), chills and productive cough X2 days, pt. noted to be Tachycardic 's, r/o Endocarditis.   According to patient, he was in his usual good state of health until Wednesday evening, he felt congestive, with productive cough, fever (pt. took oral temperature 102F) and chills.  Pt. took Levofloxacin 500mg PO X1 Wednesday evening, pt. states he carries antibiotic with him ever since he had AVR as prophylaxis measure in case he has to go to dentist or has URI.    Pt. states on Thursday morning he still felt feverish, took another Levofloxacin 500mg PO X1 hoping symptoms would resolve right away. Later in the afternoon pt. became anxious since symptoms have not resolved and went to [facility] for further evaluation.   Pt. denies, cp, SOB, palpitations, dizziness, abdominal discomfort, N/V and bowel changes. In ER ECG reveals NSR@78 bpm with non specific ST-T wave changes, wbc normal (6.7), Monocyte 15.0, Blood Glucose 206, Temp 99.0F  CXR reveals no acute pathology seen, UA negative.  Blood cultures drawn f/u results (for r/o Endocarditis), pt. was given Vancomycin 1500mg IV X1.  Pt. was explained he will need OPAL; pt. is refusing.   Pt. transferred to [address] for telemetry, ECG, Echocardiogram, RVP panel and NPO for further workup to r/o Endocarditis.  Pt. remains afebrile and states he is feeling better.  During hospital course, pt tested positive for Influenza AH1, Trop negative x 3, BCx on 1/4, 1/5, and 1/6 with NGTD. TTE on 1/4/19: Normal left and right ventricular size and systolic/diastolic function, EF 52%. 2.Bioprosthetic aortic valve, normally functioning. 3. Mild symmetric LVH, no WMA.  During hospitalization, pt adamantly refused OPAL. Pt informed that without OPAL, dx of endocarditis cannot be completely ruled out. Pt aware of consequences regarding refusing OPAL including, but not limited to, infection and death.   No significant events on telemetry overnight. Patient has been medically cleared for discharge as per Dr. Anne. Patient has been given appropriate discharge instructions including medication regimen and follow up. Medications that patient needs refills on have been e-prescribed to preferred pharmacy.     Temp 97.3 HR 91 /68 RR 18 SpO2 95% on RA  Gen: NAD, A&O x3  Cards: RRR, clear S1 and S2 without murmur  Pulm: CTA B/L without w/r/r  Abd: soft, NT/ND BS+  Ext: no LE edema or ulcerations B/L

## 2019-01-07 NOTE — DISCHARGE NOTE ADULT - MEDICATION SUMMARY - MEDICATIONS TO STOP TAKING
I will STOP taking the medications listed below when I get home from the hospital:    Levaquin 500 mg oral tablet  -- 1 tab(s) by mouth 2 times a day    Pt. only took two doses; Yesterday evening 1/2/19 and 1/3/19 in AM.  Pt. keeps with him only for prophylaxis if needed.

## 2019-01-07 NOTE — DISCHARGE NOTE ADULT - PROVIDER TOKENS
FREE:[LAST:[Primary Care Doctor and Cardiologist],PHONE:[(   )    -],FAX:[(   )    -],ADDRESS:[Please make sure to follow up with your primary care doctor and cardiologist when you return to [place]]]

## 2019-01-07 NOTE — DISCHARGE NOTE ADULT - PATIENT PORTAL LINK FT
You can access the [employer] Patient Portal, offered by [facility], by registering with the following website: [email or web address]

## 2019-01-07 NOTE — DISCHARGE NOTE ADULT - INSTRUCTIONS
Please make sure to follow a low sodium, low fat, low carbohydrate diet. Please make sure to stay hydrated at all times.

## 2019-01-07 NOTE — DISCHARGE NOTE ADULT - CARE PLAN
Principal Discharge DX:	Influenza A  Goal:	You should follow up with your primary care doctor once you return to [place].  Assessment and plan of treatment:	You came in to the hospital because of fevers, chills, and cough. While you were here, you tested positive for Influenza (Flu). There was concern for an infection of your heart valve called endocarditis. You refused to have a Transesophageal Echocardiogram (ultrasound of your heart valves) done. Your blood cultures showed no bacterial growth making this disease less likely, but cannot be 100% ruled out without the Transesophageal Echocardiogram. For the flu, you should continue taking Tamiflu (Oseltamivir) 75mg twice daily for an additional 3 days until 1/9/19. Please take Robitussin (Guaifenesin) cough syrup and throat lozenges as directed for cough. Please make sure to stay properly hydrated and return to emergency room with any persistent or worsening symptoms.  Secondary Diagnosis:	Diabetes  Assessment and plan of treatment:	Your Hemoglobin A1c which measures your average blood sugar over 3 months was 8.1%. A normal Hemoglobin A1c is under 5.7%. This means that you have diabetes. We have started you on Metformin 500mg twice daily to help lower this number. You should see your primary care doctor when you return to [place] to discuss this treatment plan further.

## 2019-01-07 NOTE — DISCHARGE NOTE ADULT - CARE PROVIDER_API CALL
Primary Care Doctor and Cardiologist,   Please make sure to follow up with your primary care doctor and cardiologist when you return to [place]  Phone: (   )    -  Fax: (   )    -

## 2019-01-09 DIAGNOSIS — I38 ENDOCARDITIS, VALVE UNSPECIFIED: ICD-10-CM

## 2019-01-09 DIAGNOSIS — J10.1 INFLUENZA DUE TO OTHER IDENTIFIED INFLUENZA VIRUS WITH OTHER RESPIRATORY MANIFESTATIONS: ICD-10-CM

## 2019-01-09 DIAGNOSIS — E11.9 TYPE 2 DIABETES MELLITUS WITHOUT COMPLICATIONS: ICD-10-CM

## 2019-01-09 DIAGNOSIS — Q23.9 CONGENITAL MALFORMATION OF AORTIC AND MITRAL VALVES, UNSPECIFIED: ICD-10-CM

## 2019-01-09 LAB
CULTURE RESULTS: SIGNIFICANT CHANGE UP
CULTURE RESULTS: SIGNIFICANT CHANGE UP
SPECIMEN SOURCE: SIGNIFICANT CHANGE UP
SPECIMEN SOURCE: SIGNIFICANT CHANGE UP

## 2019-01-10 LAB
CULTURE RESULTS: SIGNIFICANT CHANGE UP
SPECIMEN SOURCE: SIGNIFICANT CHANGE UP

## 2019-01-11 LAB
CULTURE RESULTS: SIGNIFICANT CHANGE UP
SPECIMEN SOURCE: SIGNIFICANT CHANGE UP